# Patient Record
Sex: MALE | URBAN - METROPOLITAN AREA
[De-identification: names, ages, dates, MRNs, and addresses within clinical notes are randomized per-mention and may not be internally consistent; named-entity substitution may affect disease eponyms.]

---

## 2022-08-09 ENCOUNTER — OFFICE VISIT (OUTPATIENT)
Dept: URBAN - METROPOLITAN AREA CLINIC 45 | Facility: CLINIC | Age: 27
End: 2022-08-09
Payer: COMMERCIAL

## 2022-08-09 DIAGNOSIS — Q12.0 CONGENITAL CATARACT: ICD-10-CM

## 2022-08-09 DIAGNOSIS — H10.022 OTHER MUCOPURULENT CONJUNCTIVITIS, LEFT EYE: Primary | ICD-10-CM

## 2022-08-09 PROCEDURE — 99204 OFFICE O/P NEW MOD 45 MIN: CPT | Performed by: OPTOMETRIST

## 2022-08-09 RX ORDER — OFLOXACIN 3 MG/ML
0.3 % SOLUTION/ DROPS OPHTHALMIC
Qty: 5 | Refills: 0 | Status: INACTIVE
Start: 2022-08-09 | End: 2022-09-07

## 2022-08-09 ASSESSMENT — INTRAOCULAR PRESSURE
OS: 10
OD: 12

## 2022-08-09 NOTE — IMPRESSION/PLAN
Impression: Other mucopurulent conjunctivitis, left eye: H10.022. Plan: Start Ofloxacin 1 gtt QID OS x 10 days then d/c. s/s worsen, rtc.